# Patient Record
(demographics unavailable — no encounter records)

---

## 2024-11-04 NOTE — HISTORY OF PRESENT ILLNESS
[FreeTextEntry1] : 42M presenting for consultation for an anal fistula.  PMH: Meds: All: PSH: FH: Cscope: